# Patient Record
Sex: FEMALE | Race: WHITE | NOT HISPANIC OR LATINO
[De-identification: names, ages, dates, MRNs, and addresses within clinical notes are randomized per-mention and may not be internally consistent; named-entity substitution may affect disease eponyms.]

---

## 2017-02-15 ENCOUNTER — OTHER (OUTPATIENT)
Age: 32
End: 2017-02-15

## 2017-04-12 ENCOUNTER — OTHER (OUTPATIENT)
Age: 32
End: 2017-04-12

## 2017-04-12 DIAGNOSIS — L23.9 ALLERGIC CONTACT DERMATITIS, UNSPECIFIED CAUSE: ICD-10-CM

## 2017-04-26 ENCOUNTER — APPOINTMENT (OUTPATIENT)
Dept: INTERNAL MEDICINE | Facility: CLINIC | Age: 32
End: 2017-04-26

## 2017-05-03 ENCOUNTER — RESULT REVIEW (OUTPATIENT)
Age: 32
End: 2017-05-03

## 2017-05-10 ENCOUNTER — APPOINTMENT (OUTPATIENT)
Dept: INTERNAL MEDICINE | Facility: CLINIC | Age: 32
End: 2017-05-10

## 2017-05-10 VITALS
SYSTOLIC BLOOD PRESSURE: 90 MMHG | WEIGHT: 130 LBS | RESPIRATION RATE: 12 BRPM | BODY MASS INDEX: 23.04 KG/M2 | HEIGHT: 63 IN | HEART RATE: 60 BPM | TEMPERATURE: 97.8 F | DIASTOLIC BLOOD PRESSURE: 64 MMHG

## 2017-05-10 DIAGNOSIS — Z13.220 ENCOUNTER FOR SCREENING FOR LIPOID DISORDERS: ICD-10-CM

## 2017-05-10 DIAGNOSIS — Z83.3 FAMILY HISTORY OF DIABETES MELLITUS: ICD-10-CM

## 2017-05-10 DIAGNOSIS — Z00.00 ENCOUNTER FOR GENERAL ADULT MEDICAL EXAMINATION W/OUT ABNORMAL FINDINGS: ICD-10-CM

## 2017-05-10 DIAGNOSIS — Z11.4 ENCOUNTER FOR SCREENING FOR HUMAN IMMUNODEFICIENCY VIRUS [HIV]: ICD-10-CM

## 2017-05-10 RX ORDER — METHYLPREDNISOLONE 4 MG/1
4 TABLET ORAL
Qty: 1 | Refills: 0 | Status: DISCONTINUED | COMMUNITY
Start: 2017-04-12 | End: 2017-05-10

## 2017-05-10 RX ORDER — CIPROFLOXACIN HYDROCHLORIDE 500 MG/1
500 TABLET, FILM COATED ORAL
Qty: 14 | Refills: 0 | Status: DISCONTINUED | COMMUNITY
Start: 2017-02-15 | End: 2017-05-10

## 2017-05-11 ENCOUNTER — OTHER (OUTPATIENT)
Age: 32
End: 2017-05-11

## 2017-05-11 LAB
CHOLEST SERPL-MCNC: 171 MG/DL
CHOLEST/HDLC SERPL: 2 RATIO
HDLC SERPL-MCNC: 85 MG/DL
LDLC SERPL CALC-MCNC: 76 MG/DL
TRIGL SERPL-MCNC: 52 MG/DL

## 2017-05-12 ENCOUNTER — TRANSCRIPTION ENCOUNTER (OUTPATIENT)
Age: 32
End: 2017-05-12

## 2017-05-12 LAB — HIV1+2 AB SPEC QL IA.RAPID: NONREACTIVE

## 2017-11-29 ENCOUNTER — OTHER (OUTPATIENT)
Age: 32
End: 2017-11-29

## 2017-11-29 DIAGNOSIS — R41.3 OTHER AMNESIA: ICD-10-CM

## 2017-11-30 LAB
25(OH)D3 SERPL-MCNC: 42.1 NG/ML
ANA SER IF-ACNC: NEGATIVE
CRP SERPL-MCNC: <0.2 MG/DL
ERYTHROCYTE [SEDIMENTATION RATE] IN BLOOD BY WESTERGREN METHOD: 2 MM/HR
FOLATE SERPL-MCNC: >20 NG/ML
TSH SERPL-ACNC: 1.7 UIU/ML
VIT B12 SERPL-MCNC: 698 PG/ML

## 2017-12-04 LAB — METHYLMALONATE SERPL-SCNC: 128 NMOL/L

## 2018-02-01 ENCOUNTER — OTHER (OUTPATIENT)
Age: 33
End: 2018-02-01

## 2018-02-01 DIAGNOSIS — Z87.09 PERSONAL HISTORY OF OTHER DISEASES OF THE RESPIRATORY SYSTEM: ICD-10-CM

## 2018-05-16 ENCOUNTER — RESULT REVIEW (OUTPATIENT)
Age: 33
End: 2018-05-16

## 2019-04-01 DIAGNOSIS — R52 PAIN, UNSPECIFIED: ICD-10-CM

## 2019-05-14 ENCOUNTER — OTHER (OUTPATIENT)
Age: 34
End: 2019-05-14

## 2019-05-14 DIAGNOSIS — Z87.440 PERSONAL HISTORY OF URINARY (TRACT) INFECTIONS: ICD-10-CM

## 2019-05-27 DIAGNOSIS — Z86.19 PERSONAL HISTORY OF OTHER INFECTIOUS AND PARASITIC DISEASES: ICD-10-CM

## 2019-06-24 ENCOUNTER — RESULT REVIEW (OUTPATIENT)
Age: 34
End: 2019-06-24

## 2019-06-28 ENCOUNTER — APPOINTMENT (OUTPATIENT)
Dept: ULTRASOUND IMAGING | Facility: HOSPITAL | Age: 34
End: 2019-06-28
Payer: COMMERCIAL

## 2019-06-28 ENCOUNTER — OUTPATIENT (OUTPATIENT)
Dept: OUTPATIENT SERVICES | Facility: HOSPITAL | Age: 34
LOS: 1 days | End: 2019-06-28
Payer: COMMERCIAL

## 2019-06-28 PROCEDURE — 76830 TRANSVAGINAL US NON-OB: CPT | Mod: 26

## 2019-06-28 PROCEDURE — 76856 US EXAM PELVIC COMPLETE: CPT | Mod: 26

## 2019-06-28 PROCEDURE — 76856 US EXAM PELVIC COMPLETE: CPT

## 2019-06-28 PROCEDURE — 76830 TRANSVAGINAL US NON-OB: CPT

## 2019-09-03 DIAGNOSIS — Z87.898 PERSONAL HISTORY OF OTHER SPECIFIED CONDITIONS: ICD-10-CM

## 2019-09-03 DIAGNOSIS — J02.0 STREPTOCOCCAL PHARYNGITIS: ICD-10-CM

## 2019-09-07 ENCOUNTER — TRANSCRIPTION ENCOUNTER (OUTPATIENT)
Age: 34
End: 2019-09-07

## 2020-02-18 DIAGNOSIS — N76.0 ACUTE VAGINITIS: ICD-10-CM

## 2020-02-18 DIAGNOSIS — B96.89 ACUTE VAGINITIS: ICD-10-CM

## 2020-02-18 RX ORDER — OSELTAMIVIR PHOSPHATE 75 MG/1
75 CAPSULE ORAL TWICE DAILY
Qty: 10 | Refills: 0 | Status: DISCONTINUED | COMMUNITY
Start: 2018-02-01 | End: 2020-02-18

## 2020-02-18 RX ORDER — LIDOCAINE HYDROCHLORIDE 20 MG/ML
2 JELLY TOPICAL
Qty: 2 | Refills: 2 | Status: DISCONTINUED | COMMUNITY
Start: 2019-04-01 | End: 2020-02-18

## 2020-02-18 RX ORDER — SULFAMETHOXAZOLE AND TRIMETHOPRIM 800; 160 MG/1; MG/1
800-160 TABLET ORAL
Qty: 14 | Refills: 0 | Status: COMPLETED | COMMUNITY
Start: 2019-05-14 | End: 2020-02-18

## 2020-02-18 RX ORDER — AMOXICILLIN 500 MG/1
500 TABLET, FILM COATED ORAL
Qty: 20 | Refills: 0 | Status: COMPLETED | COMMUNITY
Start: 2019-09-03 | End: 2020-02-18

## 2020-03-26 ENCOUNTER — TRANSCRIPTION ENCOUNTER (OUTPATIENT)
Age: 35
End: 2020-03-26

## 2020-04-26 ENCOUNTER — MESSAGE (OUTPATIENT)
Age: 35
End: 2020-04-26

## 2020-05-01 ENCOUNTER — APPOINTMENT (OUTPATIENT)
Dept: DISASTER EMERGENCY | Facility: CLINIC | Age: 35
End: 2020-05-01

## 2020-05-03 LAB
SARS-COV-2 IGG SERPL IA-ACNC: 98.2 INDEX
SARS-COV-2 IGG SERPL QL IA: POSITIVE

## 2020-08-07 DIAGNOSIS — L90.8 OTHER ATROPHIC DISORDERS OF SKIN: ICD-10-CM

## 2020-08-07 RX ORDER — TRETINOIN 0.5 MG/G
0.05 CREAM TOPICAL
Qty: 1 | Refills: 0 | Status: ACTIVE | COMMUNITY
Start: 2020-08-07 | End: 1900-01-01

## 2020-08-18 ENCOUNTER — RESULT REVIEW (OUTPATIENT)
Age: 35
End: 2020-08-18

## 2020-09-03 ENCOUNTER — LABORATORY RESULT (OUTPATIENT)
Age: 35
End: 2020-09-03

## 2020-09-03 ENCOUNTER — APPOINTMENT (OUTPATIENT)
Dept: HEMATOLOGY ONCOLOGY | Facility: CLINIC | Age: 35
End: 2020-09-03
Payer: COMMERCIAL

## 2020-09-03 VITALS
DIASTOLIC BLOOD PRESSURE: 66 MMHG | SYSTOLIC BLOOD PRESSURE: 122 MMHG | OXYGEN SATURATION: 98 % | HEART RATE: 69 BPM | HEIGHT: 63 IN | WEIGHT: 135.6 LBS | TEMPERATURE: 99.1 F | BODY MASS INDEX: 24.03 KG/M2

## 2020-09-03 DIAGNOSIS — I82.409 ACUTE EMBOLISM AND THROMBOSIS OF UNSPECIFIED DEEP VEINS OF UNSPECIFIED LOWER EXTREMITY: ICD-10-CM

## 2020-09-03 PROCEDURE — 36415 COLL VENOUS BLD VENIPUNCTURE: CPT

## 2020-09-03 PROCEDURE — 99204 OFFICE O/P NEW MOD 45 MIN: CPT | Mod: 25

## 2020-09-03 RX ORDER — METRONIDAZOLE 500 MG/1
500 TABLET ORAL TWICE DAILY
Qty: 14 | Refills: 0 | Status: COMPLETED | COMMUNITY
Start: 2020-02-18 | End: 2020-09-03

## 2020-09-03 RX ORDER — FLUCONAZOLE 150 MG/1
150 TABLET ORAL
Qty: 2 | Refills: 0 | Status: COMPLETED | COMMUNITY
Start: 2019-05-27 | End: 2020-09-03

## 2020-09-03 RX ORDER — PREDNISONE 10 MG/1
10 TABLET ORAL
Qty: 35 | Refills: 0 | Status: COMPLETED | COMMUNITY
Start: 2017-05-11 | End: 2020-09-03

## 2020-09-04 LAB
25(OH)D3 SERPL-MCNC: 47.3 NG/ML
APTT 2H P 1:4 NP PPP: 35.8 SEC
APTT 2H P INC PPP: 36.2 SEC
APTT IMM NP/PRE NP PPP: 35.6 SEC
APTT INV RATIO PPP: 37 SEC
BASOPHILS # BLD AUTO: 0.06 K/UL
BASOPHILS NFR BLD AUTO: 1 %
CONFIRM: 30.3 SEC
DRVVT IMM 1:2 NP PPP: NORMAL
DRVVT SCREEN TO CONFIRM RATIO: 0.93 RATIO
EOSINOPHIL # BLD AUTO: 0.41 K/UL
EOSINOPHIL NFR BLD AUTO: 6.9 %
ERYTHROCYTE [SEDIMENTATION RATE] IN BLOOD BY WESTERGREN METHOD: 31 MM/HR
FERRITIN SERPL-MCNC: 23 NG/ML
HAPTOGLOB SERPL-MCNC: 153 MG/DL
HCT VFR BLD CALC: 40.7 %
HCYS SERPL-MCNC: 10.5 UMOL/L
HGB BLD-MCNC: 13 G/DL
IMM GRANULOCYTES NFR BLD AUTO: 0.2 %
IRON SATN MFR SERPL: 9 %
IRON SERPL-MCNC: 31 UG/DL
LDH SERPL-CCNC: 164 U/L
LYMPHOCYTES # BLD AUTO: 2.06 K/UL
LYMPHOCYTES NFR BLD AUTO: 34.7 %
MAN DIFF?: NORMAL
MCHC RBC-ENTMCNC: 26.7 PG
MCHC RBC-ENTMCNC: 31.9 GM/DL
MCV RBC AUTO: 83.6 FL
MONOCYTES # BLD AUTO: 0.29 K/UL
MONOCYTES NFR BLD AUTO: 4.9 %
NEUTROPHILS # BLD AUTO: 3.1 K/UL
NEUTROPHILS NFR BLD AUTO: 52.3 %
NPP NORMAL POOLED PLASMA: 34.4 SECS
PLATELET # BLD AUTO: 216 K/UL
RBC # BLD: 4.87 M/UL
RBC # FLD: 14.4 %
SCREEN DRVVT: 31.1 SEC
SILICA CLOTTING TIME INTERPRETATION: NORMAL
SILICA CLOTTING TIME S/C: 0.95 RATIO
TIBC SERPL-MCNC: 353 UG/DL
UIBC SERPL-MCNC: 322 UG/DL
VIT B12 SERPL-MCNC: 717 PG/ML
WBC # FLD AUTO: 5.93 K/UL

## 2020-09-05 LAB
B2 GLYCOPROT1 IGA SERPL IA-ACNC: 7.4 SAU
B2 GLYCOPROT1 IGG SER-ACNC: <5 SGU
B2 GLYCOPROT1 IGM SER-ACNC: <5 SMU
CARDIOLIPIN AB SER IA-ACNC: POSITIVE

## 2020-09-08 LAB
AT III PPP CHRO-ACNC: 113 %
FACT VIII ACT/NOR PPP: 92 %
PROT C PPP CHRO-ACNC: 97 %
PROT S AG ACT/NOR PPP IA: 95 %

## 2020-09-15 DIAGNOSIS — E72.12 METHYLENETETRAHYDROFOLATE REDUCTASE DEFICIENCY: ICD-10-CM

## 2020-09-15 DIAGNOSIS — R53.83 OTHER FATIGUE: ICD-10-CM

## 2020-09-15 LAB
BASOPHILS # BLD AUTO: 0.07 K/UL
BASOPHILS NFR BLD AUTO: 1.7 %
DNA PLOIDY SPEC FC-IMP: NORMAL
EOSINOPHIL # BLD AUTO: 0.32 K/UL
EOSINOPHIL NFR BLD AUTO: 7.6 %
HCT VFR BLD CALC: 35.5 %
HGB BLD-MCNC: 11.1 G/DL
IMM GRANULOCYTES NFR BLD AUTO: 0.2 %
LYMPHOCYTES # BLD AUTO: 1.45 K/UL
LYMPHOCYTES NFR BLD AUTO: 34.4 %
MAN DIFF?: NORMAL
MCHC RBC-ENTMCNC: 26.9 PG
MCHC RBC-ENTMCNC: 31.3 GM/DL
MCV RBC AUTO: 86.2 FL
MONOCYTES # BLD AUTO: 0.3 K/UL
MONOCYTES NFR BLD AUTO: 7.1 %
NEUTROPHILS # BLD AUTO: 2.07 K/UL
NEUTROPHILS NFR BLD AUTO: 49 %
PLATELET # BLD AUTO: 213 K/UL
PTR INTERP: NORMAL
RBC # BLD: 4.12 M/UL
RBC # FLD: 14.5 %
WBC # FLD AUTO: 4.22 K/UL

## 2020-09-15 NOTE — CONSULT LETTER
[Dear  ___] : Dear  [unfilled], [Consult Letter:] : I had the pleasure of evaluating your patient, [unfilled]. [Consult Closing:] : Thank you very much for allowing me to participate in the care of this patient.  If you have any questions, please do not hesitate to contact me. [Please see my note below.] : Please see my note below. [Sincerely,] : Sincerely, [FreeTextEntry3] : Leandra Humphrey MD\par

## 2020-09-15 NOTE — ASSESSMENT
[FreeTextEntry1] : Young athletic patient with a history of upper extremity DVT unprovoked about 10 years ago.\par \par Patient came in for hypercoagulable work-up prior to starting birth control with either hormone eluding IUD or birth control pills.\par \par Hypercoagulable work-up was essentially negative, however given patient's history of an unusual DVT at the very young age I would recommend avoiding hormone therapy.... She might consider progesterone only IUD, but even this was reported to be associated with increased chance for DVT...\par \par If this patient develops  another DVT she will require  lifelong of anticoagulation... All this was discussed in detail with patient on the phone  today.\par \par I therefore recommend alternative contraception without hormones.\par \par Thanks

## 2020-09-15 NOTE — HISTORY OF PRESENT ILLNESS
[de-identified] : 35 years old white female, past medical history of an unprovoked DVT at age 25 while she was on OCP... Patient was taken off OCP then and placed on an IUD, copper IUD however currently she has increased vaginal bleeding and would like to start hormone eluding IUD... She is here to have a hypercoagulable work-up ahead of the IUD placement.

## 2020-09-30 RX ORDER — FLUCONAZOLE 150 MG/1
150 TABLET ORAL DAILY
Qty: 7 | Refills: 0 | Status: ACTIVE | COMMUNITY
Start: 2020-09-30 | End: 1900-01-01

## 2020-12-16 PROBLEM — Z87.09 HISTORY OF INFLUENZA: Status: RESOLVED | Noted: 2018-02-01 | Resolved: 2020-12-16

## 2020-12-18 DIAGNOSIS — U07.1 COVID-19: ICD-10-CM

## 2020-12-21 PROBLEM — Z86.19 HISTORY OF CANDIDIASIS OF VAGINA: Status: RESOLVED | Noted: 2019-05-27 | Resolved: 2020-12-21

## 2020-12-21 PROBLEM — Z87.440 HISTORY OF URINARY TRACT INFECTION: Status: RESOLVED | Noted: 2019-05-14 | Resolved: 2020-12-21

## 2020-12-21 PROBLEM — J02.0 PHARYNGITIS DUE TO STREPTOCOCCUS SPECIES: Status: RESOLVED | Noted: 2019-09-03 | Resolved: 2020-12-21

## 2020-12-23 PROBLEM — N76.0 BACTERIAL VAGINOSIS: Status: RESOLVED | Noted: 2020-02-18 | Resolved: 2020-12-23

## 2021-01-07 DIAGNOSIS — R05 COUGH: ICD-10-CM

## 2021-06-02 DIAGNOSIS — Z86.16 PERSONAL HISTORY OF COVID-19: ICD-10-CM

## 2021-06-08 LAB
COVID-19 NUCLEOCAPSID  GAM ANTIBODY INTERPRETATION: POSITIVE
COVID-19 SPIKE DOMAIN ANTIBODY INTERPRETATION: POSITIVE
SARS-COV-2 AB SERPL IA-ACNC: >250 U/ML
SARS-COV-2 AB SERPL QL IA: 67.1 INDEX

## 2021-10-26 ENCOUNTER — RESULT REVIEW (OUTPATIENT)
Age: 36
End: 2021-10-26